# Patient Record
Sex: FEMALE
[De-identification: names, ages, dates, MRNs, and addresses within clinical notes are randomized per-mention and may not be internally consistent; named-entity substitution may affect disease eponyms.]

---

## 2019-01-21 LAB
BASOPHILS # BLD AUTO: 0 10*3/UL (ref 0–0.1)
BASOPHILS NFR BLD AUTO: 0.6 % (ref 0–1)
DEPRECATED NEUTROPHILS # BLD AUTO: 4.1 10*3/UL (ref 2.1–6.9)
EOSINOPHIL # BLD AUTO: 0.1 10*3/UL (ref 0–0.4)
EOSINOPHIL NFR BLD AUTO: 1.8 % (ref 0–6)
ERYTHROCYTE [DISTWIDTH] IN CORD BLOOD: 13.6 % (ref 11.7–14.4)
HCT VFR BLD AUTO: 38.6 % (ref 34.2–44.1)
HGB BLD-MCNC: 13.1 G/DL (ref 12–16)
LYMPHOCYTES # BLD: 2.4 10*3/UL (ref 1–3.2)
LYMPHOCYTES NFR BLD AUTO: 34 % (ref 18–39.1)
MCH RBC QN AUTO: 30.5 PG (ref 28–32)
MCHC RBC AUTO-ENTMCNC: 33.9 G/DL (ref 31–35)
MCV RBC AUTO: 89.8 FL (ref 81–99)
MONOCYTES # BLD AUTO: 0.4 10*3/UL (ref 0.2–0.8)
MONOCYTES NFR BLD AUTO: 5.9 % (ref 4.4–11.3)
NEUTS SEG NFR BLD AUTO: 57.6 % (ref 38.7–80)
PLATELET # BLD AUTO: 301 X10E3/UL (ref 140–360)
RBC # BLD AUTO: 4.3 X10E6/UL (ref 3.6–5.1)

## 2019-01-24 ENCOUNTER — HOSPITAL ENCOUNTER (OUTPATIENT)
Dept: HOSPITAL 88 - OR | Age: 29
Discharge: HOME | End: 2019-01-24
Attending: OBSTETRICS & GYNECOLOGY
Payer: COMMERCIAL

## 2019-01-24 VITALS — SYSTOLIC BLOOD PRESSURE: 117 MMHG | DIASTOLIC BLOOD PRESSURE: 69 MMHG

## 2019-01-24 DIAGNOSIS — Z01.812: ICD-10-CM

## 2019-01-24 DIAGNOSIS — N87.0: Primary | ICD-10-CM

## 2019-01-24 PROCEDURE — 57520 CONIZATION OF CERVIX: CPT

## 2019-01-24 PROCEDURE — 85025 COMPLETE CBC W/AUTO DIFF WBC: CPT

## 2019-01-24 PROCEDURE — 36415 COLL VENOUS BLD VENIPUNCTURE: CPT

## 2019-01-24 PROCEDURE — 88304 TISSUE EXAM BY PATHOLOGIST: CPT

## 2019-01-24 PROCEDURE — 88307 TISSUE EXAM BY PATHOLOGIST: CPT

## 2019-01-24 PROCEDURE — 88342 IMHCHEM/IMCYTCHM 1ST ANTB: CPT

## 2019-01-24 PROCEDURE — 84702 CHORIONIC GONADOTROPIN TEST: CPT

## 2019-01-24 NOTE — XMS REPORT
Clinical Summary

                             Created on: 2019



Shania Gonzalez

External Reference #: [id number]

: 1990

Sex: Female



Demographics







                          Address                   [address]

[place]  [address]

 

                          Home Phone                [phone]

 

                          Preferred Language        English

 

                          Marital Status            

 

                          Anglican Affiliation     [id number]

 

                          Race                      White

 

                          Ethnic Group              /Latin





Author







                          Author                    [employer]

 

                          Organization              [employer]

 

                          Address                   Unknown

 

                          Phone                     Unavailable







Support







                Name            Relationship    Address         Phone

 

                Wally Gonzalez    [employer]            Unknown         [phone]







Care Team Providers







                    Care Team Member Name    Role                Phone

 

                    Asked, No Pcp       PCP                 Unavailable







Allergies

No Known Allergies



Medications

No known medications



Active Problems





Not on file



Encounters







                          Care Team                 Description



                     Date                Type                Specialty  

 

                                        



Keith Herring MD                  Open wound of umbilical region, initial encounter (Primary

 Dx)



                     2018          Emergency           Emergency Medicine  

 

                                        



Rob Nagy, SDC



Yaya Robertson MD                         Fall from standing, initial encounter (Primary Dx); 

Injury of head, initial encounter; 

Concussion without loss of consciousness, initial encounter



                     2018          Emergency           Emergency Medicine  



                                         -    



                                         2018    



after 2018



Social History







                                        Date



                 Tobacco Use     Types           Packs/Day       Years Used 

 

                                         



                                         Never Smoker    

 

    



                                         Smokeless Tobacco: Never   



                                         Used   









   



                 Alcohol Use     Drinks/Week     oz/Week         Comments

 

   



                           Yes                       occasional.









 



                           Sex Assigned at Birth     Date Recorded

 

 



                                         Not on file 









                                        Industry



                           Job Start Date            Occupation 

 

                                        Not on file



                           Not on file               Not on file 









                                        Travel End



                           Travel History            Travel Start 

 





                                         No recent travel history available.







Last Filed Vital Signs







                                        Time Taken



                           Vital Sign                Reading 

 

                                        2018 11:37 AM CDT



                           Blood Pressure            110/59 

 

                                        2018 12:06 AM CDT



                           Pulse                     75 

 

                                        2018 11:37 AM CDT



                           Temperature               37.1 C (98.8 F) 

 

                                        2018 11:37 AM CDT



                           Respiratory Rate          16 

 

                                        2018 11:37 AM CDT



                           Oxygen Saturation         100% 

 

                                        -



                           Inhaled Oxygen            - 



                                         Concentration  

 

                                        2018 11:37 AM CDT



                           Weight                    71.2 kg (157 lb) 

 

                                        2018 11:37 AM CDT



                           Height                    170.2 cm (5' 7") 

 

                                        2018 11:37 AM CDT



                           Body Mass Index           24.59 







Plan of Treatment







   



                 Health Maintenance     Due Date        Last Done       Comments

 

   



                           CERVICAL CANCER SCREENING     2012  

 

   



                           INFLUENZA VACCINE         2018  







Procedures







                                        Comments



                 Procedure Name     Priority        Date/Time       Associated Diagnosis 

 

                                        



Results for this procedure are in the results section.



                     CT ABDOMEN PELVIS W     STAT                2018  



                           CONTRAST                  1:07 PM CDT  

 

                                        



Results for this procedure are in the results section.



                     HCG QUALITATIVE, SERUM     STAT                2018  



                           SCREEN                    11:50 AM CDT  

 

                                        



Results for this procedure are in the results section.



                     ESTIMATED GFR       STAT                2018  



                                         11:50 AM CDT  

 

                                        



Results for this procedure are in the results section.



                     COMPREHENSIVE METABOLIC     STAT                2018  



                           PANEL                     11:50 AM CDT  

 

                                        



Results for this procedure are in the results section.



                     PARTIAL THROMBOPLASTIN     STAT                2018  



                           TIME (PTT)                11:50 AM CDT  

 

                                        



Results for this procedure are in the results section.



                     PROTHROMBIN TIME WITH INR     STAT                2018  



                                         11:50 AM CDT  

 

                                        



Results for this procedure are in the results section.



                     HC COMPLETE BLD COUNT     STAT                2018  



                           W/AUTO DIFF               11:50 AM CDT  

 

                                        



Results for this procedure are in the results section.



                     CT HEAD WO CONTRAST     STAT                2018  



                                         11:13 PM CDT  

 

                                        



Results for this procedure are in the results section.



                     CT CERVICAL SPINE WO     STAT                2018  



                           CONTRAST                  11:12 PM CDT  

 

                                        



Results for this procedure are in the results section.



                     HCG QUALITATIVE, URINE     Routine             2018  



                           SCREEN                    10:00 PM CDT  



after 2018



Results

* CT Abdomen Pelvis W Contrast (2018  1:07 PM CDT)





 



                           Narrative                 Performed At

 

 



                           EXAMINATION:CT ABDOMEN PELVIS W CONTRAST     [facility]



                                         CLINICAL HISTORY: 28 yearsFemale Abd infection (incl peritonitis), pt 



                                         wnumerous laparoscopic surgerieshas defect in belly buttoneval for 





                                         underlying abscesscommunicating fistula in the area of the navel 



                                         TECHNIQUE: Multiple axial images of the abdomen and pelvis were obtained 



                                         following intravenous administration of iodinated contrast. Sagittal and coronal

 



                                         computerized reformatted images were also obtained. CT imaging was performed 



                                         with iterative 



                                         reconstruction techniques and/or automated exposure control to reduce radiation

 



                                         dose. 



                                         COMPARISON:None. 



                                         IMPRESSION: 



                                         Lung bases: 



                                         1.There are no focal consolidations or effusions. 



                                         Abdomen: 



                                         1. The liver, spleen, pancreas, gallbladder and biliary tree, adrenal 



                                         glands, and kidneys are normal. 



                                         2.The abdominal aorta is normal in caliber. There is no evidence of 



                                         retroperitoneal mass or adenopathy. 



                                         3.The appendix is absent. There is no evidence of intestinal obstruction or

 



                                         free intraperitoneal air. 



                                         4.No focal abnormality is identified in the umbilical region. 



                                         Pelvis: 



                                         1. There are changes related to bilateral tubal ligation. 



                                         2.A 4.2 cm low density mass is present in the left adnexum. There is a tiny

 



                                         amount of free fluid in the pelvic cul-de-sac. 



                                         Musculoskeletal: 



                                         Regional skeletal structures are within normal limits. 



                                         SUMMARY: 



                                         1.4.2 cm low-density mass left adnexum likely represents a hemorrhagic cyst.

 



                                         Pelvic ultrasonography is recommended for further evaluation. 



                                         2.Status post bilateral tubal ligation. 



                                         3.No evidence of umbilicus fluid collection or fistula. 



                                         [facility]-[id number] 









                                        Procedure Note

 

                                        



 Interface, Radiology Results Incoming - 2018  1:22 PM CDT



EXAMINATION:  CT ABDOMEN PELVIS W CONTRAST



CLINICAL HISTORY: 28 yearsFemale Abd infection (incl peritonitis), pt w  
numerous laparoscopic surgeries  has defect in belly button  eval for underlying
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



TECHNIQUE: Multiple axial images of the abdomen and pelvis were obtained 
following intravenous administration of iodinated contrast. Sagittal and coronal
computerized reformatted images were also obtained. CT imaging was performed 
with iterative 

reconstruction techniques and/or automated exposure control to reduce radiation 
dose. 



COMPARISON:  None.



IMPRESSION:



Lung bases:

                                        1.  There are no focal consolidations or effusions.



Abdomen:

                                        1.   The liver, spleen, pancreas, gallbladder and biliary tree, adrenal glands, 

and kidneys are normal. 

                                        2.  The abdominal aorta is normal in caliber. There is no evidence of retroperitoneal

 mass or adenopathy.

                                        3.  The appendix is absent. There is no evidence of intestinal obstruction or free

 intraperitoneal air.

                                        4.  No focal abnormality is identified in the umbilical region.



Pelvis:

                                        1.   There are changes related to bilateral tubal ligation.

                                        2.  A 4.2 cm low density mass is present in the left adnexum. There is a tiny amount

 of free fluid in the pelvic cul-de-sac.



Musculoskeletal:

Regional skeletal structures are within normal limits.



SUMMARY:



                                        1.  4.2 cm low-density mass left adnexum likely represents a hemorrhagic cyst. Pelvic

 ultrasonography is recommended for further evaluation.

                                        2.  Status post bilateral tubal ligation.

                                        3.  No evidence of umbilicus fluid collection or fistula.









[facility]-[id number]











   



                 Performing Organization     Address         City/State/Zipcode     Phone Number

 

   



                     [facility]          [address] [place] [address] 





* Estimated GFR (2018 11:50 AM CDT)





   



                 Component       Value           Ref Range       Performed At

 

   



                 Estimated GFR     >=90            mL/min/1.73 m2     [facility] DEPARTMENT OF



                           Comment:                  PATHOLOGY AND



                           CatergoryUnitsInte      GENOMIC MEDICINE



                                         rpretation  



                                         G1  



                                         >=90 Normal or high  



                                         G2  



                                         60-89Mildly decreased  



                                         [id number]  



                                         Mildly to moderately  



                                         decreased  



                                         I0v42-14  



                                         Moderately to severely  



                                         decreased  



                                         G4  



                                         15-29Severely  



                                         decreased  



                                         G5  



                                         <15Kidney failure  



                                         The eGFR was calculated using  



                                         the Chronic Kidney Disease  



                                         Epidemiology Collaboration  



                                         (CKD-EPI) equation.  



                                         Interpretation is based on  



                                         recommendations of the  



                                         National Kidney  



                                         Foundation-Kidney Disease  



                                         Outcomes Quality  



                                         Initiative (NKF-KDOQI)  



                                         published in 2014.  













                                         Specimen

 





                                         Plasma specimen









   



                 Performing Organization     Address         City/State[facility]     Phone Number

 

   



                     [address] [place] [address] 



                                         PATHOLOGY AND [facility]   



                                         MEDICINE   





* Partial thromboplastin time, activated (2018 11:50 AM CDT)





   



                 Component       Value           Ref Range       Performed At

 

   



                 PTT             31.9            23.0 - 36.0 sec     [facility] DEPARTMENT OF



                           Comment:                  PATHOLOGY AND



                           PTT therapeutic range for      GENOMIC MEDICINE



                                         unfractionated heparin is  



                                         61.0-112.0 seconds which  



                                         corresponds to Anti-Xa  



                                         0.3-0.7 U/ml.  













                                         Specimen

 





                                         Blood









   



                 Performing Organization     Address         City/State/[facility]     Phone Number

 

   



                     [address] [place] [address] 



                                         PATHOLOGY AND [facility]   



                                         MEDICINE   





* Prothrombin time with INR (2018 11:50 AM CDT)





   



                 Component       Value           Ref Range       Performed At

 

   



                 Prothrombin time     12.1            12.0 - 15.0 sec     [facility] DEPARTMENT OF



                                         PATHOLOGY AND



                                         [facility] MEDICINE

 

   



                     INR                 0.9                 [facility] DEPARTMENT OF



                           Comment:                  PATHOLOGY AND



                           The International Normalized      GENOMIC MEDICINE



                                         Ratio (INR) is a therapeutic  



                                         monitoring tool for patients  



                                         who are stable on oral  



                                         anticoagulant therapy. An INR  



                                         of 2.0-3.0 is suggested for  



                                         deep  



                                         vein thrombosis/pulmonary  



                                         embolism.  













                                         Specimen

 





                                         Blood









   



                 Performing Organization     Address         City/State/[facility]     Phone Number

 

   



                     [address] [place] [address] 



                                         PATHOLOGY AND [facility]   



                                         MEDICINE   





* CBC with platelet and differential (2018 11:50 AM CDT)





   



                 Component       Value           Ref Range       Performed At

 

   



                 WBC             9.37            4.50 - 11.00 k/uL     [facility] DEPARTMENT OF



                                         PATHOLOGY AND



                                         [facility] MEDICINE

 

   



                 RBC             4.42            4.20 - 5.50 m/uL     [facility] DEPARTMENT OF



                                         PATHOLOGY AND



                                         GENOMIC MEDICINE

 

   



                 HGB             12.8            12.0 - 16.0 g/dL     [facility] DEPARTMENT OF



                                         PATHOLOGY AND



                                         GENOMIC MEDICINE

 

   



                 HCT             38.7            37.0 - 47.0 %     [facility] DEPARTMENT OF



                                         PATHOLOGY AND



                                         GENOMIC MEDICINE

 

   



                 MCV             87.6            82.0 - 100.0 fL     [facility] DEPARTMENT OF



                                         PATHOLOGY AND



                                         GENOMIC MEDICINE

 

   



                 MCH             29.0            27.0 - 34.0 pg     [facility] DEPARTMENT OF



                                         PATHOLOGY AND



                                         GENOMIC MEDICINE

 

   



                 MCHC            33.1            31.0 - 37.0 g/dL     [facility] DEPARTMENT OF



                                         PATHOLOGY AND



                                         GENOMIC MEDICINE

 

   



                 RDW - SD        46.5            37.0 - 55.0 fL     [facility] DEPARTMENT OF



                                         PATHOLOGY AND



                                         GENOMIC MEDICINE

 

   



                 MPV             11.2            8.8 - 13.2 fL     [facility] DEPARTMENT OF



                                         PATHOLOGY AND



                                         GENOMIC MEDICINE

 

   



                 Platelet count     305             150 - 400 k/uL     [facility] DEPARTMENT OF



                                         PATHOLOGY AND



                                         GENOMIC MEDICINE

 

   



                 Nucleated RBC     0.00            /100 WBC        [facility] DEPARTMENT OF



                                         PATHOLOGY AND



                                         GENOMIC MEDICINE

 

   



                 Neutrophils     66.1            39.0 - 69.0 %     [facility] DEPARTMENT OF



                                         PATHOLOGY AND



                                         GENOMIC MEDICINE

 

   



                 Lymphocytes     26.4            25.0 - 45.0 %     [facility] DEPARTMENT OF



                                         PATHOLOGY AND



                                         GENOMIC MEDICINE

 

   



                 Monocytes       5.7             0.0 - 10.0 %     [facility] DEPARTMENT OF



                                         PATHOLOGY AND



                                         GENOMIC MEDICINE

 

   



                 Eosinophils     1.1             0.0 - 5.0 %     [facility] DEPARTMENT OF



                                         PATHOLOGY AND



                                         GENOMIC MEDICINE

 

   



                 Basophils       0.4             0.0 - 1.0 %     [facility] DEPARTMENT OF



                                         PATHOLOGY AND



                                         GENOMIC MEDICINE













                                         Specimen

 





                                         Blood









   



                 Performing Organization     Address         City/[facility]/Zipcode     Phone Number

 

   



                     [address] [place] [address] 



                                         PATHOLOGY AND [facility]   



                                         MEDICINE   





* hCG qualitative, serum screen (2018 11:50 AM CDT)





   



                 Component       Value           Ref Range       Performed At

 

   



                     hCG qualitative, serum     Negative            [facility] DEPARTMENT OF



                           Comment:                  PATHOLOGY AND



                           LOT # [id number]              GENOMIC MEDICINE



                                         EXP : 2020  













                                         Specimen

 





                                         Blood









   



                 Performing Organization     Address         City/[facility]/Zipcode     Phone Number

 

   



                     [address] [place] [address] 



                                         PATHOLOGY [facility]   



                                         MEDICINE   





* Comprehensive metabolic panel (2018 11:50 AM CDT)





   



                 Component       Value           Ref Range       Performed At

 

   



                 Sodium          138             135 - 148 mEq/L     [facility] DEPARTMENT OF



                                         PATHOLOGY AND



                                         GENOMIC MEDICINE

 

   



                 Potassium       4.0             3.5 - 5.0 mEq/L     [facility] DEPARTMENT OF



                                         PATHOLOGY AND



                                         GENOMIC MEDICINE

 

   



                 Chloride        101             98 - 112 mEq/L     [facility] DEPARTMENT OF



                                         PATHOLOGY AND



                                         GENOMIC MEDICINE

 

   



                 CO2             26              24 - 31 mEq/L     [facility] DEPARTMENT OF



                                         PATHOLOGY AND



                                         GENOMIC MEDICINE

 

   



                 Anion gap       11@ANIO         7 - 15 mEq/L     [facility] OF



                                         PATHOLOGY AND



                                         GENOMIC MEDICINE

 

   



                 BUN             10              6 - 20 mg/dL     [facility] OF



                                         PATHOLOGY AND



                                         GENOMIC MEDICINE

 

   



                 Creatinine      0.70            0.50 - 0.90 mg/dL     [facility] DEPARTMENT OF



                                         PATHOLOGY AND



                                         GENOMIC MEDICINE

 

   



                 Glucose         129 (H)         65 - 99 mg/dL     [facility] DEPARTMENT OF



                                         PATHOLOGY AND



                                         GENOMIC MEDICINE

 

   



                 Calcium         9.9             8.3 - 10.2 mg/dL     [facility] DEPARTMENT OF



                                         PATHOLOGY AND



                                         GENOMIC MEDICINE

 

   



                 Protein         8.2             6.3 - 8.3 g/dL     [facility] DEPARTMENT OF



                           Comment:                  PATHOLOGY AND



                                 GENOMIC MEDICINE



                                          4.6-7.0 g/dL  



                                         1  



                                         week  



                                          4.4-7.6 g/dL  



                                         7  



                                         months-1year  



                                         5.1-7.3 g/dL  



                                         1-2  



                                         years5.6-7  



                                         .5 g/dL  



                                         >3  



                                         years6.0-8  



                                         .0 g/dL  



                                           



                                          6.3-8.3 g/dL  

 

   



                 Albumin         4.7             3.5 - 5.0 g/dL     [facility] OF



                                         PATHOLOGY AND



                                         GENOMIC MEDICINE

 

   



                 A/G ratio       1.3             0.7 - 3.8       [facility] DEPARTMENT OF



                                         PATHOLOGY AND



                                         GENOMIC MEDICINE

 

   



                 Alkaline phosphatase     59              35 - 104 U/L     [facility] OF



                                         PATHOLOGY AND



                                         GENOMIC MEDICINE

 

   



                 AST             16              10 - 35 U/L     [facility] OF



                                         PATHOLOGY AND



                                         GENOMIC MEDICINE

 

   



                 ALT             8               5 - 50 U/L      [facility] OF



                                         PATHOLOGY AND



                                         GENOMIC MEDICINE

 

   



                 Total bilirubin     0.4             0.0 - 1.2 mg/dL     [facility] DEPARTMENT OF



                                         PATHOLOGY AND



                                         GENOMIC MEDICINE













                                         Specimen

 





                                         Plasma specimen









   



                 Performing Organization     Address         City/State/Zipcode     Phone Number

 

   



                     [facility] DEPARTMENT OF     [address] [place] [address] 



                                         PATHOLOGY AND GENOMIC   



                                         MEDICINE   





* CT Head Wo Contrast (2018 11:13 PM CDT)





 



                           Narrative                 Performed At

 

 



                           EXAMINATION: CT HEAD WO CONTRAST     [facility]



                                         CLINICAL HISTORY: s p fallhead injury 



                                         COMPARISON:None. 



                                         TECHNIQUE: Noncontrast enhanced images of the brain were obtained from the skull

 



                                         base to the vertex. Both soft tissue and bone reconstruction algorithms were 



                                         performed. 



                                         CT scans are performed using radiation dose reduction techniques (iterative 



                                         reconstruction and/or automated exposure control). Technical factors are 



                                         evaluated and adjusted to ensure appropriate moderation of exposure. Automated 





                                         dose management technology 



                                         is applied to adjust radiation exposure while achieving a diagnostic quality 



                                         image. 



                                         FINDINGS: 



                                         The brain parenchyma is unremarkable. The gray-white matter differentiation is 





                                         preserved. No evidence of acute intra or extra-axial hemorrhage, mass, mass 



                                         effect or acute territorial infarction. There is no acute hydrocephalus. Basal 





                                         cisterns are patent. 



                                         No acute soft tissue hematoma or laceration. No skull fractures or aggressive 



                                         bony lesions. 



                                         Paranasal sinuses and mastoid air cells are clear. Orbits are normal. 



                                         IMPRESSION: 



                                         No acute intracranial abnormality identified. 



                                         [facility]-[id number] 









                                        Procedure Note

 

                                        



 Interface, Radiology Results Incoming - 2018 11:18 PM CDT



EXAMINATION: CT HEAD WO CONTRAST



CLINICAL HISTORY: s p fall  head injury



COMPARISON:  None.



TECHNIQUE: Noncontrast enhanced images of the brain were obtained from the skull
base to the vertex. Both soft tissue and bone reconstruction algorithms were 
performed.  



CT scans are performed using radiation dose reduction techniques (iterative 
reconstruction and/or automated exposure control). Technical factors are 
evaluated and adjusted to ensure appropriate moderation of exposure. Automated 
dose management technology

 is applied to adjust radiation exposure while achieving a diagnostic quality 
image.



FINDINGS:



The brain parenchyma is unremarkable. The gray-white matter differentiation is 
preserved. No evidence of acute intra or extra-axial hemorrhage, mass, mass 
effect or acute territorial infarction. There is no acute hydrocephalus. Basal 
cisterns are patent.

 



No acute soft tissue hematoma or laceration. No skull fractures or aggressive 
bony lesions. 



Paranasal sinuses and mastoid air cells are clear. Orbits are normal. 



IMPRESSION:



No acute intracranial abnormality identified.





[facility]-[id number]











   



                 Performing Organization     Address         City/State/Zipcode     Phone Number

 

   



                     [facility]          [address] [place] [address] 





* CT Cervical Spine Wo Contrast (2018 11:12 PM CDT)





 



                           Narrative                 Performed At

 

 



                           EXAMINATION: CT CERVICAL SPINE WO CONTRAST      [facility]



                                         CLINICAL HISTORY: neck painafter trauma 



                                         COMPARISON:None 



                                         TECHNIQUE: Noncontrast enhanced imaging through the cervical spine was performed

 



                                         with coronal and sagittal reconstructed images. 



                                         CT scans are performed using radiation dose reduction techniques (iterative 



                                         reconstruction and/or automated exposure control). Technical factors are 



                                         evaluated and adjusted to ensure appropriate moderation of exposure. Automated 





                                         dose management technology 



                                         is applied to adjust radiation exposure while achieving a diagnostic quality 



                                         image. 



                                         FINDINGS: 



                                         Cervical lordosis is maintained.No acute fractures or subluxations.No 



                                         soft tissue abnormalities are seen. 



                                         No significant posterior disc disease, spinal canal or neural foraminal 



                                         stenosis. 



                                         IMPRESSION: 



                                         No acute osseous abnormality of the cervical spine. 



                                         [facility]-[id number] 









                                        Procedure Note

 

                                        



Hm Interface, Radiology Results Incoming - 2018 11:20 PM CDT



EXAMINATION: CT CERVICAL SPINE WO CONTRAST



CLINICAL HISTORY: neck pain  after trauma



COMPARISON:  None



TECHNIQUE: Noncontrast enhanced imaging through the cervical spine was performed
with coronal and sagittal reconstructed images.



CT scans are performed using radiation dose reduction techniques (iterative 
reconstruction and/or automated exposure control). Technical factors are 
evaluated and adjusted to ensure appropriate moderation of exposure. Automated 
dose management technology

 is applied to adjust radiation exposure while achieving a diagnostic quality 
image.



FINDINGS:



Cervical lordosis is maintained.  No acute fractures or subluxations.  No soft 
tissue abnormalities are seen.

 

No significant posterior disc disease, spinal canal or neural foraminal 
stenosis.





IMPRESSION: 



No acute osseous abnormality of the cervical spine.





[facility]-[id number]











   



                 Performing Organization     Address         City[facility]     Phone Number

 

   



                     [facility]          [address] [place] [address] 





* hCG qualitative, urine screen (2018 10:00 PM CDT)





   



                 Component       Value           Ref Range       Performed At

 

   



                 hCG qualitative, urine     Negative        Negative        [facility] DEPARTMENT OF



                           Comment:                  PATHOLOGY AND



                           The manufacturers stated      GENOMIC MEDICINE



                                         sensitivity of HcG test for  



                                         serum is >/=10  



                                         mIU/ml and urine is  



                                         >/=20mIU/ml.  













                                         Specimen

 





                                         Urine









   



                 Performing Organization     Address         City[facility]     Phone Number

 

   



                     [facility] DEPARTMENT OF     [address] [place] [address] 



                                         PATHOLOGY AND GENOMIC   



                                         MEDICINE   





after 2018



Insurance







     



            Payer      Benefit     Subscriber ID     Type       Phone      Address



                                         Plan /    



                                         Group    

 

     



                 MEDICAID        MEDICAID        xxxxxxxxx       Medicaid  









     



            Guarantor Name     Account     Relation to     Date of     Phone      Billing Address



                     Type                Patient             Birth  

 

     



            Shania Gonzalez     Personal/F     Self       1990     [phone]     [address]               (Home)              [address]



                                         [place] [address]







Advance Directives





Patient has advance care planning documents on file. For more information, alexis aviles contact:



Geremias Mccoy



[address] [place] [address]

## 2019-01-24 NOTE — OPERATIVE REPORT
DATE OF PROCEDURE:  January 24, 2019 

 

PREOPERATIVE DIAGNOSIS:  SCARLET-I.



POSTOPERATIVE DIAGNOSIS:  SCARLET-I.



PROCEDURE:  Cone biopsy.



COMPLICATIONS:  None.



ESTIMATED BLOOD LOSS:  100 mL.



Patient was taken to the OR where general anesthesia was placed, and 

prepped and draped in a sterile fashion.  Placed in the dorsal lithotomy 

position.  Vicryl 0 stay sutures were placed at 3 and 9 o'clock on the 

cervix.  Lugol Iodine was applied on the cervix and showed no _______ test 

positive areas.  A cone biopsy was performed with scalpel.  The C-bed was 

cauterized with the Bovie.  Multiple interrupted figure-of-8 sutures were 

applied on the periphery around the circumference of the cervix.  

Hemostasis was achieved.  FloSeal and Surgicel were placed in.  The patient 

tolerated the procedure well.  Lap, sponge and needle counts were correct 

times 2 at the end of the procedure. 









DD:  01/24/2019 13:32

DT:  01/24/2019 14:28

Job#:  [id number]